# Patient Record
Sex: FEMALE | Race: BLACK OR AFRICAN AMERICAN
[De-identification: names, ages, dates, MRNs, and addresses within clinical notes are randomized per-mention and may not be internally consistent; named-entity substitution may affect disease eponyms.]

---

## 2019-08-01 ENCOUNTER — HOSPITAL ENCOUNTER (EMERGENCY)
Dept: HOSPITAL 10 - FTE | Age: 24
Discharge: HOME | End: 2019-08-01
Payer: COMMERCIAL

## 2019-08-01 ENCOUNTER — HOSPITAL ENCOUNTER (EMERGENCY)
Dept: HOSPITAL 91 - FTE | Age: 24
Discharge: HOME | End: 2019-08-01
Payer: COMMERCIAL

## 2019-08-01 VITALS — SYSTOLIC BLOOD PRESSURE: 138 MMHG | DIASTOLIC BLOOD PRESSURE: 83 MMHG | RESPIRATION RATE: 18 BRPM | HEART RATE: 83 BPM

## 2019-08-01 VITALS — WEIGHT: 171.96 LBS | BODY MASS INDEX: 39.8 KG/M2 | HEIGHT: 55 IN

## 2019-08-01 DIAGNOSIS — M62.838: Primary | ICD-10-CM

## 2019-08-01 PROCEDURE — 96372 THER/PROPH/DIAG INJ SC/IM: CPT

## 2019-08-01 PROCEDURE — 72040 X-RAY EXAM NECK SPINE 2-3 VW: CPT

## 2019-08-01 PROCEDURE — 81025 URINE PREGNANCY TEST: CPT

## 2019-08-01 PROCEDURE — 99284 EMERGENCY DEPT VISIT MOD MDM: CPT

## 2019-08-01 RX ADMIN — CYCLOBENZAPRINE 1 MG: 10 TABLET, FILM COATED ORAL at 10:34

## 2019-08-01 RX ADMIN — KETOROLAC TROMETHAMINE 1 MG: 30 INJECTION, SOLUTION INTRAMUSCULAR at 10:34

## 2019-08-01 NOTE — ERD
ER Documentation


Chief Complaint


Chief Complaint





NECK PAIN





HPI


24-year-old female presents with complaint of neck pain for the past day.  


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 


her neck last week.  Patient denies any fevers, chills, photophobia, nausea, 


numbness, history of trauma, weakness.





ROS


All systems reviewed and are negative except as per history of present illness.





Medications


Home Meds


Active Scripts


Cyclobenzaprine Hcl* (Cyclobenzaprine Hcl*) 10 Mg Tablet, 10 MG PO TID, #15 TAB


   Prov:EYLSE PEREZ         19


Ibuprofen* (Motrin*) 600 Mg Tab, 600 MG PO Q6, #30 TAB


   Prov:ELYSE PEREZ         19





Allergies


Allergies:  


Coded Allergies:  


     Sulfa (Sulfonamide Antibiotics) (Verified  Allergy, Unknown, 19)





FmHx


Family History:  No diabetes, No coronary disease, No other





Physical Exam


Vitals





Vital Signs


  Date      Temp  Pulse  Resp  B/P (MAP)   Pulse Ox  O2          O2 Flow    FiO2


Time                                                 Delivery    Rate


    19  98.1     83    18      138/83        99


     10:11                          (101)





Physical Exam


Const:   No acute distress


Head:   Atraumatic 


Eyes:    Normal Conjunctiva


ENT:    Normal External Ears, Nose and Mouth.


Neck:              Limited rotation.  Flexion and extension intact.. No 


meningismus.  No midline tenderness.  No step-offs or bony deformities noted.


Resp:   Clear to auscultation bilaterally


Cardio:   Regular rate and rhythm, no murmurs


Abd:    Soft, non tender, non distended. Normal bowel sounds


Skin:   No petechiae or rashes


Back:   No midline or flank tenderness


Ext:    No cyanosis, or edema.  5 out of 5 strength in upper extremities 


bilaterally.  Distal pulses and sensation intact.


Neur:   Awake and alert


Psych:    Normal Mood and Affect


Results 24 hrs





Laboratory Tests


                    Test
                      19
10:33


                    POC Beta HCG, Qualitative  NEGATIVE





Current Medications


 Medications
   Dose
          Sig/Ashley
       Start Time
   Status  Last


 (Trade)       Ordered        Route
 PRN     Stop Time              Admin
Dose


                              Reason                                Admin


 Ketorolac
     60 mg          ONCE  STAT
    19        DC            19


Tromethamine
                 IM
            10:20
 19                10:34



 (Toradol)                                   10:22


                20 mg          ONCE  ONCE
    19        DC            19


Cyclobenzapri                 PO
            10:30
 19                10:34



ne
 HCl
                                     10:31


(Flexeril)








Procedures/MDM


                            DIAGNOSTIC IMAGING REPORT





Patient: RIA BOUCHER   : 1995   Age: 24  Sex: F                       





       [facility]:    [id number]   [id number]    DOS: 19 0000


Ordering MD: ELYSE PEREZ    Location:  [facility]   Room/Bed:                    


                       


                                        


PROCEDURE:   XR Cervical Spine. 


 


CLINICAL INDICATION:   pain 


 


TECHNIQUE:   AP, lateral and odontoid views of the cervical spine were 


performed. The images were reviewed on a PACS workstation. 


 


COMPARISON:   None. 


 


FINDINGS:


There is straightening of the cervical lordosis.  


The vertebral body alignment, height and osseous mineralization are normal. 


The intervertebral disc spaces are well maintained. 


There are no abnormal calcifications. 


The prevertebral soft tissues are normal. No radiopaque foreign bodies are 


identified. 


There is no acute fracture or subluxation.


 


RPTAT: AA


 


IMPRESSION:


Straightening of the cervical lordosis.


_____________________________________________ 


.Tejas Vigil MD, MD           Date    Time 


Electronically viewed and signed by .Tejas Vigil MD, MD on 2019 


10:57 


 


D:  2019 10:57  T:  2019 10:57


.S/





CC: ELYSE PEREZ





[id number]








MDM: X-rays were taken results showed straightening of the normal lordosis but 


no fracture or other pathology.  Patient presents consistent with muscle spasm. 


Patient given Toradol and cyclobenzaprine pain in the ER.  Patient discharged 


with ibuprofen and cyclobenzaprine.  I have low suspicion for cervical spine 


dislocation, cervical spine fracture, epidural abscess, cervical disk 


herniation, osteomyelitis, meningitis, torticollis,  or any acute neurological 


deficit.  At this time, patient is stable for discharge and outpatient 


management. I have instructed the patient to follow-up with his/her primary care


physician in 1 day. I have discussed with the patient the possibility of needing


to see a specialist for further workup and imaging studies if symptoms persist. 


I have instructed the patient to promptly return to the ER for any new or 


worsening symptoms including but not limited to increased pain, fever, nausea, 


vomiting, weakness or LOC. The patient and/or family expressed understanding of 


and agreement with this plan. All questions were answered. Home care 


instructions were provided. 








DISCLAIMER: 


Inadvertent spelling and grammatical errors are likely due to EHR/dictation 


software use and do not reflect on the overall quality of patient care. Also, 


please note that the electronic time recorded on this note does not necessarily 


reflect the actual time of the patient encounter.





Departure


Diagnosis:  


   Primary Impression:  


   Neck muscle spasm


Condition:  Stable











ELYSE PEREZ                Aug 1, 2019 10:27